# Patient Record
Sex: MALE | Race: WHITE | NOT HISPANIC OR LATINO
[De-identification: names, ages, dates, MRNs, and addresses within clinical notes are randomized per-mention and may not be internally consistent; named-entity substitution may affect disease eponyms.]

---

## 2023-02-13 PROBLEM — Z00.00 ENCOUNTER FOR PREVENTIVE HEALTH EXAMINATION: Status: ACTIVE | Noted: 2023-02-13

## 2023-02-15 ENCOUNTER — APPOINTMENT (OUTPATIENT)
Dept: NEUROSURGERY | Facility: CLINIC | Age: 58
End: 2023-02-15
Payer: COMMERCIAL

## 2023-02-15 VITALS
DIASTOLIC BLOOD PRESSURE: 71 MMHG | HEART RATE: 60 BPM | OXYGEN SATURATION: 98 % | BODY MASS INDEX: 25.05 KG/M2 | HEIGHT: 70 IN | WEIGHT: 175 LBS | SYSTOLIC BLOOD PRESSURE: 113 MMHG

## 2023-02-15 DIAGNOSIS — Z80.7 FAMILY HISTORY OF OTHER MALIGNANT NEOPLASMS OF LYMPHOID, HEMATOPOIETIC AND RELATED TISSUES: ICD-10-CM

## 2023-02-15 DIAGNOSIS — Z78.9 OTHER SPECIFIED HEALTH STATUS: ICD-10-CM

## 2023-02-15 DIAGNOSIS — Z86.79 PERSONAL HISTORY OF OTHER DISEASES OF THE CIRCULATORY SYSTEM: ICD-10-CM

## 2023-02-15 DIAGNOSIS — K46.9 UNSPECIFIED ABDOMINAL HERNIA W/OUT OBSTRUCTION OR GANGRENE: ICD-10-CM

## 2023-02-15 DIAGNOSIS — Z86.59 PERSONAL HISTORY OF OTHER MENTAL AND BEHAVIORAL DISORDERS: ICD-10-CM

## 2023-02-15 PROCEDURE — 99205 OFFICE O/P NEW HI 60 MIN: CPT

## 2023-02-15 RX ORDER — LEVETIRACETAM 750 MG/1
750 TABLET, FILM COATED ORAL TWICE DAILY
Qty: 60 | Refills: 1 | Status: ACTIVE | COMMUNITY
Start: 2023-02-15 | End: 1900-01-01

## 2023-02-15 RX ORDER — LEVETIRACETAM 500 MG/1
500 TABLET, FILM COATED ORAL TWICE DAILY
Qty: 60 | Refills: 1 | Status: DISCONTINUED | COMMUNITY
Start: 2023-02-15 | End: 2023-02-15

## 2023-02-15 NOTE — HISTORY OF PRESENT ILLNESS
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 Nguyen. He has a PMHx of [employer] dual-chamber pacemaker placement following presentation with recurrent syncope. 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 Patient was recently in [place] on vacation when he experienced the sudden onset of right sided distal upper and lower extremity apraxia associated with decreased sensation. Patient described sensation as a "discomfort" and as a loss of proprioception. Denies paresthesias. Reports decreased dexterity in his hand during this episode. Patient also mentioned right sided lower extremity discomfort for about 8 weeks prior which he attributed to radiculpathy related to his history of dextroscoliosis. He was evaluated at [facility]. Stroke work up was negative for ischemic or hemorrhagic stroke. However, CT demonstrated findings most consistent with a left parietal parafalcine/medial convexity meningioma with subjacent vasogenic edema. Patient was unable to obtain MRI at that time due to questionable compatibly with patient's pacemaker. Symptoms resolved and was discharged home. \par \par He denies recurrence of symptoms since initial onset. Denies headaches, visual disturbances, upper/lower extremity numbness/paresthesias, or gait instability.

## 2023-02-15 NOTE — DATA REVIEWED
[de-identified] : CT HEAD WITHOUT CONTRAST: 2/10/23: IMPRESSION- Approximately 3 cm x 2.8 cm mildly hyperdense left posterior parietal/vertex mass with associated vasogenic edema, suspect meningioma. There is regional mass effect on the left lateral ventricle/occipital horn. No large territory infract or hemorrhage.  [de-identified] : CTA HEAD/NECK: 2/10/23: IMPRESSION- No significant stenosis at the intradural/visualized segments. No evidence of intracranial aneurysm or vascular malformation.

## 2023-02-15 NOTE — ASSESSMENT
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 CT and CTA brain 2/10/23 reviewed independently by me demonstrates an approximately 3 cm x 2.8 cm hyperdense left medial parietal, parafalcine, extra-axial mass lesion with subjacent vasogenic edema. There is regional mass effect on the left lateral ventricle/occipital horn. Findings are most consistent with a left parietal parafalcine/medial convexity meningioma.\par \par Natural history and alternative management strategies discussed in detail with patient and his wife. Surgical resection with preoperative endovascular transarterial tumor embolization recommended. Risks of diagnostic cerebral angiography with endovascular tumor embolization including but not limited to bleeding, infection, vascular injury, need for surgical repair, life threatening hematoma, limb ischemia, renal failure, stroke, coma/death, weakness/paralysis, visual/sensory loss, loss of speech/language/cognitive/memory function, changes in personality/behavior, failure of procedure, need for additional procedures discussed. Risks of surgery including but not limited to bleeding, infection, vascular injury, brain hemorrhage, brain swelling, stroke, coma/death, weakness/paralysis, visual/sensory loss, loss of speech/language/cognitive/memory function, changes in personality/behavior, failure to improve, failure to resect, recurrence, need for subsequent procedures discussed. I discussed the probability of transient right sided weakness and sensory changes, lower extremity greater than upper extremity, due to the location of the tumor relative to Rolandic cortex. He and his wife are considering my recommendations. \par \par I have requested MRI brain with and without gadolinium to include a frameless protocol. Our office is helping to facilitate this with radiology given Dr. Gamez's pacemaker (which is reported to be MRI compatible).\par \par If  Dr. Gamez and Mrs. Gamez wish to proceed, we will schedule him for left craniotomy for resection of meningioma with frameless stereotactic navigation in the near future. He will require a frameless protocol MRI with gadolinium and frameless CT head without contrast prior to the scheduled date for surgery. We will schedule him for preoperative diagnostic cerebral angiography with endovascular tumor embolization with general anesthesia 1-3 days prior to surgical resection. \par \par I have asked the Dr. Gamez and his wife to contact me for any symptomatic development or progression in the interim at which time we can obtain expedited follow up imaging. They have indicated that they will contact my office with how they wish to proceed. I have also offered to facilitate additional neurosurgical opinions if they wish to pursue this, both within and external to the system. \par \par A total of 60 minutes were spent relative to this encounter.\par \par \par \par \par

## 2023-02-17 ENCOUNTER — TRANSCRIPTION ENCOUNTER (OUTPATIENT)
Age: 58
End: 2023-02-17

## 2023-02-21 ENCOUNTER — RESULT REVIEW (OUTPATIENT)
Age: 58
End: 2023-02-21

## 2023-02-23 ENCOUNTER — APPOINTMENT (OUTPATIENT)
Dept: NEUROSURGERY | Facility: HOSPITAL | Age: 58
End: 2023-02-23

## 2023-02-26 ENCOUNTER — RESULT REVIEW (OUTPATIENT)
Age: 58
End: 2023-02-26

## 2023-02-27 ENCOUNTER — APPOINTMENT (OUTPATIENT)
Dept: NEUROSURGERY | Facility: HOSPITAL | Age: 58
End: 2023-02-27

## 2023-02-27 ENCOUNTER — TRANSCRIPTION ENCOUNTER (OUTPATIENT)
Age: 58
End: 2023-02-27

## 2023-02-28 ENCOUNTER — TRANSCRIPTION ENCOUNTER (OUTPATIENT)
Age: 58
End: 2023-02-28

## 2023-03-01 ENCOUNTER — TRANSCRIPTION ENCOUNTER (OUTPATIENT)
Age: 58
End: 2023-03-01

## 2023-03-09 ENCOUNTER — APPOINTMENT (OUTPATIENT)
Dept: HEMATOLOGY ONCOLOGY | Facility: CLINIC | Age: 58
End: 2023-03-09
Payer: COMMERCIAL

## 2023-03-09 VITALS
SYSTOLIC BLOOD PRESSURE: 129 MMHG | BODY MASS INDEX: 24.77 KG/M2 | HEIGHT: 70 IN | DIASTOLIC BLOOD PRESSURE: 76 MMHG | OXYGEN SATURATION: 100 % | RESPIRATION RATE: 18 BRPM | HEART RATE: 61 BPM | TEMPERATURE: 99.2 F | WEIGHT: 173 LBS

## 2023-03-09 DIAGNOSIS — D32.9 BENIGN NEOPLASM OF MENINGES, UNSPECIFIED: ICD-10-CM

## 2023-03-09 PROCEDURE — 99205 OFFICE O/P NEW HI 60 MIN: CPT

## 2023-03-09 RX ORDER — ARIPIPRAZOLE 2 MG/1
2 TABLET ORAL DAILY
Qty: 30 | Refills: 0 | Status: ACTIVE | COMMUNITY
Start: 2023-03-09

## 2023-03-09 RX ORDER — ESCITALOPRAM OXALATE 20 MG/1
20 TABLET ORAL DAILY
Qty: 30 | Refills: 0 | Status: ACTIVE | COMMUNITY
Start: 2023-03-09

## 2023-03-09 NOTE — ASSESSMENT
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 Adjuvant RT improves local control, aims to prevent further neurologic morbidity related to growth of the residual tumor, and may improve survival. Accurate delineation of the residual tumor following surgery using contemporary imaging studies is critical for optimal results with postoperative RT. \par \par For patients who undergo apparent complete resection of an atypical meningioma, the role of adjuvant RT is controversial. Although multicenter trials are ongoing, there are no randomized data yet available to guide decision-making in these patients, and practice varies across centers. The potential benefits of adjuvant RT in terms of preventing or delaying tumor recurrence are more closely balanced with the risks and side effects of RT, and it is particularly important to assess individual patient preferences and tolerance for risk, which may vary when presented with similar information. The goal of RT is to maximize local tumor control and prevent neurologic morbidity associated with tumor recurrence. The estimated recurrence rate after imaging-confirmed gross total resection in patients not treated with adjuvant RT is approximately 30 to 50 percent at a median of five years or less, with rates of failure trending higher with longer follow-up.\par \par In the intermediate-risk group of the prospective Radiation Therapy Oncology Group (RTOG) 0539 trial, 52 patients with completely resected, newly diagnosed atypical meningioma or recurrent grade 1 meningioma were enrolled prospectively; all received RT (54 Gy in 30 fractions). Three-year progression-free survival was 94 percent, which was improved compared with an expected rate of 70 percent based on historical controls. Very similar three-year progression-free survival (89 percent) was reported in the prospective arm of the  [employer]ization for Research and Treatment of Cancer (EORTC) 54930-47572 study that included 56 patients with completely resected atypical meningiomas who received a higher dose of postoperative RT (60 Gy in 30 fractions).\par \par In this situation, there is known residual, thoguh not macroscopic. Unable to obtain MRI as PM leads are incompatible and he needs likely PM exchange. \par \par He will need MRIB to better monitor for disease recurrence. \par Will refer to radiation oncology for RT evaluation.\par Will present at [facility].\par \par RTC after MRIB.

## 2023-03-09 NOTE — HISTORY OF PRESENT ILLNESS
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  \par Feb 2023:He was in [place] in February 2023, when he had sudden onset of right-sided distal upper and lower extremity apraxia associated with decreased sensation.  He went to ED and had CTH which revealed left parietal parafalcine/medical convexity meningioma with subadjacent vasogenic edema.\par 2/15/2023: Saw .\par 2/22/2023: CT HEAD WITHOUT CONTRAST: There is a 3.0 x 3.1 x 3.1 cm hyperdense extra axial mass along the parasagittal left frontal and parietal lobes in the region of the central sulcus. There is mass effect with vasogenic edema in the posterior left frontal parietal white matter, and partial effacement of the posterior body and posterior horn of the left lateral ventricle. IMPRESSION: CT head: Left parasagittal frontoparietal hyperdense extra-axial mass with mass effect, may represent a meningioma.\par 2/23/2023: Tumor embolization. Resection, A small amount of tumor capsule was necessarily left in situ due to adherence to the bridging vein just proximal to its entry into the superior sagittal sinus in order to avoid injury to this large vein and possible neurologic sequelae. At this time, all bulky tumor had been removed. \par 2/27/2023 Pathology: Atypical meningioma (WHO grade 2). Tissue sections show a meningioma  with brain invasion (better seen on B5). Immunostains are performed on unstatined slides from original block B3 at [facility] and show tumor cells are positive for EMA (patchy). Rare tumor cells are positive for NM. The Ki-67 labeling index is moderately elevated (7-9%). pHH3 immunostain shows the mitotic activity is increased (4-5 mitoses/10 HPF).  The findings are diagnostic of atypical meningioma, WHO grade 2.\par 2/28/23: Status post biparietal craniotomy at the vertex for resection of previously seen left parietal parafalcine extra-axial mass, likely meningioma. Gross total resection suggested, however, limited without IV contrast. Expected postsurgical changes including a tiny amount of hemorrhagic products adjacent to the surgical site and pneumocephalus. No large intracranial hemorrhage. Stable residual vasogenic edema in the left parietal white matter.   [de-identified] : Atypical meningioma - WHO grade 2 [de-identified] : NeuroSx: \par Neurology:  [de-identified] : Feels well since the surgery. No more episodes of unilateral numbness and loss of sensation.

## 2023-03-20 ENCOUNTER — NON-APPOINTMENT (OUTPATIENT)
Age: 58
End: 2023-03-20

## 2023-03-28 ENCOUNTER — APPOINTMENT (OUTPATIENT)
Dept: NEUROSURGERY | Facility: CLINIC | Age: 58
End: 2023-03-28
Payer: COMMERCIAL

## 2023-03-28 PROCEDURE — 99024 POSTOP FOLLOW-UP VISIT: CPT

## 2023-03-28 NOTE — PHYSICAL EXAM
[General Appearance - Alert] : alert [General Appearance - In No Acute Distress] : in no acute distress [General Appearance - Well Nourished] : well nourished [Oriented To Time, Place, And Person] : oriented to person, place, and time [Impaired Insight] : insight and judgment were intact [Affect] : the affect was normal [Cranial Nerves Optic (II)] : visual acuity intact bilaterally,  pupils equal round and reactive to light [Cranial Nerves Oculomotor (III)] : extraocular motion intact [Cranial Nerves Trigeminal (V)] : facial sensation intact symmetrically [Cranial Nerves Facial (VII)] : face symmetrical [Cranial Nerves Vestibulocochlear (VIII)] : hearing was intact bilaterally [Cranial Nerves Glossopharyngeal (IX)] : tongue and palate midline [Cranial Nerves Accessory (XI - Cranial And Spinal)] : head turning and shoulder shrug symmetric [Cranial Nerves Hypoglossal (XII)] : there was no tongue deviation with protrusion [Motor Tone] : muscle tone was normal in all four extremities [Motor Strength] : muscle strength was normal in all four extremities [Sensation Tactile Decrease] : light touch was intact [Abnormal Walk] : normal gait [Balance] : balance was intact [Well-Healed] : well-healed

## 2023-03-28 NOTE — DATA REVIEWED
[de-identified] : CT HEAD WITHOUT CONTRAST: 2/27/23: IMPRESSION- tatus post biparietal craniotomy at the vertex for resection of\par previously seen left parietal parafalcine extra-axial mass, likely meningioma.\par Gross total resection suggested, however, limited without IV contrast. \par \par 2. Expected postsurgical changes including a tiny amount of hemorrhagic\par products adjacent to the surgical site and pneumocephalus. No large\par intracranial hemorrhage. Stable residual vasogenic edema in the left parietal\par white matter.\par CT HEAD WITHOUT CONTRAST: 2/23/23: IMPRESSION- Left parasagittal frontal parietal hyperdense extra-axial mass with small foci\par of hyperdensity posteriorly medially, likely related to embolization as per\par history. The mass may represent a meningioma.\par \par No acute intracranial hemorrhage.\par

## 2023-03-28 NOTE — HISTORY OF PRESENT ILLNESS
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 A small amount of tumor capsule was necessary left in situ due to adherence to a large cortical bridging vein to the superior sagittal sinus. Patient was discharged on 3/23 and prescribed Keppra 750 mg po BID as well as dexamethasone taper. \par \par Patient has completed dexamethasone taper. Reports gradual improvement of distal right lower extremity residual numbness and altered sensation which has now become intermittent and has significantly decreased in severity, Reports recent onset of mild bitemporal headaches which resolve with Tylenol. Patient was evaluated by Dr. Nanci Landers and confirmed pre operative paroxysmal episodes were most consistent with simple partial seizures. He continues to take Keppra 750 mg BID under Dr. Landers's direction. Patient evaluated by neuro oncologist, Dr. Issac Cervantes, who recommended adjuvant radiation therapy. Today, he was evaluated by radiation oncologist, Dr. Karolyn River from [facility], who recommended adjuvant radiation therapy as well due to atypical, WHO Grade II status of meningioma. Patient plans to proceed with stereotactic radiation therapy following MRI which has been scheduled at [facility] for June 2023. Denies other neurological symptoms at this time.

## 2023-03-28 NOTE — ASSESSMENT
[FreeTextEntry1] : Dr. Gamez presents status post Tanner Grade II resection of a left parietal parafalcine WHO Grade II meningioma following preoperative endovascular transarterial embolization. A small amount of tumor capsule was necessarily left in situ due to adherence to the bridging vein just proximal to its entry into the superior sagittal sinus in order to avoid injury to this large vein and possible neurologic sequelae. He has made a remarkable recovery. He has experienced near complete resolution of now intermittent right lower extremity numbness when compared to pre operative status.\par \par I am quite please with Dr. Gamez's recovery. I plan to obtain MRI brain with and without gadolinium to include a frameless protocol in 3 months to serve as a new postoperative baseline MRI. Dr. Gamez has been approved to undergo this MRI at [facility] in June 2023. I have asked Dr. Gamez to obtain a CD with this MRI in June so that I may review it. I will then follow up with him by phone or Telehealth visit, or in person if he prefers. Dr. Gamez has an upcoming appointment for an additional opinion regarding radiation therapy with Dr. Portillo.  If the patient decides to proceed with radiation therapy, I will then defer the timing of additional surveillance imaging to the radiation oncology team. \par \par I counseled Dr. Gamez to continue follow up with Dr. Landers for antiseizure prophylaxis. \par \par I have asked the patient and his wife to contact me for any symptomatic development or progression in the interim at which time we can obtain expedited follow up imaging.\par \par A total of 45 minutes were spent relative to this encounter.\par  \par \par \par

## 2023-04-12 ENCOUNTER — RX RENEWAL (OUTPATIENT)
Age: 58
End: 2023-04-12

## 2023-04-25 ENCOUNTER — APPOINTMENT (OUTPATIENT)
Dept: RADIATION ONCOLOGY | Facility: CLINIC | Age: 58
End: 2023-04-25

## 2023-08-28 ENCOUNTER — APPOINTMENT (OUTPATIENT)
Dept: NEUROSURGERY | Facility: CLINIC | Age: 58
End: 2023-08-28
Payer: COMMERCIAL

## 2023-08-28 PROCEDURE — 99215 OFFICE O/P EST HI 40 MIN: CPT

## 2023-08-28 NOTE — DATA REVIEWED
[de-identified] : CT HEAD WITHOUT CONTRAST: 8/22/23: IMPRESSION- Postsurgical changes related to meningioma resection, as described above.     No evidence of acute intracranial pathology.     Inflammatory changes of the paranasal sinuses, as described above, including  small bilateral maxillary sinus air-fluid levels, which could indicate acute  sinusitis, correlate clinically. Additional findings as described above.     [de-identified] : MRI BRAIN WITH AND WITHOUT CONTRAST: 6/9/23: IMPRESSION- Parietal craniotomy. Underlying the craniotomy, surgical cavity is seen in the left parietal region which causes some mass effect on the underlying pars marginalis. There is no enhancement seen to suggest residual/recurrent meningioma.

## 2023-08-28 NOTE — HISTORY OF PRESENT ILLNESS
[FreeTextEntry1] : Dr. 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 Previous history of preoperative left middle meningeal artery parietal branch transarterial tumor embolization 2/23/23 followed by left parietal craniotomy for parafalcine atypical meningioma (WHO Grade II Ki67 labeling index 7-9%, 4-5 mitoses/HPF). Surveillance MRI 6/9/23 demonstrated no overt evidence for meningioma recurrence. He has proceeded with radiation therapy at [facility], total of 33 rounds of fractionated stereotactic radiotherapy, which completed last Wednesday, August 23rd.  Dr. Gamez returns to the office with wound drainage that began approximately 2-3 weeks ago.  States that he first noticed a small scab along the right lateral margin of the incision line that he manipulated and began bleeding at that time.  Last week, he began experiencing increased drainage which he described as a mixture of blood, clear fluid, and a small amount of purulent material.  He was referred to plastic surgery, Dr. Santosh Paulson, and was placed on a 10-day course of cefadroxil 500mg daily and Silvadene cream.  He is scheduled to undergo wound revision under the direction of Dr. Paulson on 8/30/23.  CT head without contrast performed on 8/22, demonstrating no underlying destruction of the bone suggestive of osteomyelitis and no extraaxial fluid collection to suggest infectious process.    He otherwise denies headaches, visual disturbances, extremity numbness/weakness, gait instability, fever.

## 2023-08-28 NOTE — ASSESSMENT
[FreeTextEntry1] : Dr. Gamez status post Tanner Grade II resection of a left parietal parafalcine WHO Grade II meningioma following preoperative endovascular transarterial embolization.  Surveillance MRI 6/9/23 reviewed independently by me demonstrates no overt evidence for residual or recurrent meningioma.  He has completed fractionated radiation therapy at [facility] as of 8/23.  Presents with 2-3 week history of wound drainage within radiation field at the right lateral margin of his previously healed scalp incision.  CT head without contrast 8/22/23 reviewed independently by me demonstrates no evidence of osteomyelitis or extra axial fluid collection. The area of wound dehiscence is not directly overlying implanted cranial fixation hardware.  Currently receiving oral antibiotics and local wound care under the direction of Dr. Santosh Paulson, who is planning wound revision and complex closure on 8/30/23.  We will continue to perform surveillance imaging every 6 months given the atypical nature of his meningioma and at the recommendation of his radiation oncology team.  Next scheduled MRI brain with and without gadolinium including frameless protocol will be performed in December 2023.  Due to his permanent pacemaker, the patient obtains his MRI in a specialized facility through [facility].  Therefore, we will begin coordinating his MRI in November 2023.  I have asked the patient and his wife to contact me for any symptomatic development or progression in the interim at which time we can obtain expedited follow up imaging.  A total of 45 minutes were spent relative to this encounter.

## 2023-08-28 NOTE — PHYSICAL EXAM
[General Appearance - Alert] : alert [General Appearance - In No Acute Distress] : in no acute distress [Oriented To Time, Place, And Person] : oriented to person, place, and time [Impaired Insight] : insight and judgment were intact [Cranial Nerves Optic (II)] : visual acuity intact bilaterally,  pupils equal round and reactive to light [Cranial Nerves Oculomotor (III)] : extraocular motion intact [Cranial Nerves Trigeminal (V)] : facial sensation intact symmetrically [Cranial Nerves Facial (VII)] : face symmetrical [Cranial Nerves Vestibulocochlear (VIII)] : hearing was intact bilaterally [Cranial Nerves Glossopharyngeal (IX)] : tongue and palate midline [Cranial Nerves Accessory (XI - Cranial And Spinal)] : head turning and shoulder shrug symmetric [Cranial Nerves Hypoglossal (XII)] : there was no tongue deviation with protrusion [Sensation Tactile Decrease] : light touch was intact [Abnormal Walk] : normal gait [Balance] : balance was intact [FreeTextEntry1] : left scalp wound with approximately 1cm in circumference.  Scant serosanguinous drainage on dressing placed by patient at home this morning.  No active drainage.  Mild surrounding erythema.

## 2023-08-31 ENCOUNTER — TRANSCRIPTION ENCOUNTER (OUTPATIENT)
Age: 58
End: 2023-08-31

## 2023-11-02 DIAGNOSIS — R05.9 COUGH, UNSPECIFIED: ICD-10-CM

## 2024-01-29 ENCOUNTER — NON-APPOINTMENT (OUTPATIENT)
Age: 59
End: 2024-01-29

## 2025-01-21 ENCOUNTER — APPOINTMENT (OUTPATIENT)
Dept: NEUROSURGERY | Facility: CLINIC | Age: 60
End: 2025-01-21
Payer: COMMERCIAL

## 2025-01-21 PROCEDURE — G2211 COMPLEX E/M VISIT ADD ON: CPT | Mod: 95

## 2025-01-21 PROCEDURE — 99215 OFFICE O/P EST HI 40 MIN: CPT | Mod: 95

## 2025-02-11 DIAGNOSIS — B00.9 HERPESVIRAL INFECTION, UNSPECIFIED: ICD-10-CM

## 2025-02-11 RX ORDER — VALACYCLOVIR 1 G/1
1 TABLET, FILM COATED ORAL
Qty: 20 | Refills: 1 | Status: ACTIVE | COMMUNITY
Start: 2025-02-11 | End: 1900-01-01

## 2025-05-28 ENCOUNTER — NON-APPOINTMENT (OUTPATIENT)
Age: 60
End: 2025-05-28

## 2025-05-30 ENCOUNTER — NON-APPOINTMENT (OUTPATIENT)
Age: 60
End: 2025-05-30

## 2025-05-30 ENCOUNTER — APPOINTMENT (OUTPATIENT)
Dept: INTERNAL MEDICINE | Facility: CLINIC | Age: 60
End: 2025-05-30
Payer: COMMERCIAL

## 2025-05-30 VITALS
HEIGHT: 70 IN | TEMPERATURE: 98.2 F | HEART RATE: 60 BPM | RESPIRATION RATE: 16 BRPM | SYSTOLIC BLOOD PRESSURE: 114 MMHG | BODY MASS INDEX: 27.35 KG/M2 | WEIGHT: 191 LBS | DIASTOLIC BLOOD PRESSURE: 72 MMHG | OXYGEN SATURATION: 99 %

## 2025-05-30 DIAGNOSIS — F32.5 MAJOR DEPRESSIVE DISORDER, SINGLE EPISODE, IN FULL REMISSION: ICD-10-CM

## 2025-05-30 DIAGNOSIS — E78.5 HYPERLIPIDEMIA, UNSPECIFIED: ICD-10-CM

## 2025-05-30 DIAGNOSIS — D32.9 BENIGN NEOPLASM OF MENINGES, UNSPECIFIED: ICD-10-CM

## 2025-05-30 DIAGNOSIS — F41.9 ANXIETY DISORDER, UNSPECIFIED: ICD-10-CM

## 2025-05-30 DIAGNOSIS — I49.5 SICK SINUS SYNDROME: ICD-10-CM

## 2025-05-30 PROCEDURE — 93000 ELECTROCARDIOGRAM COMPLETE: CPT

## 2025-05-30 PROCEDURE — 99386 PREV VISIT NEW AGE 40-64: CPT

## 2025-06-02 ENCOUNTER — NON-APPOINTMENT (OUTPATIENT)
Age: 60
End: 2025-06-02

## 2025-06-04 DIAGNOSIS — D69.6 THROMBOCYTOPENIA, UNSPECIFIED: ICD-10-CM

## 2025-06-04 LAB
ALBUMIN SERPL ELPH-MCNC: 4.8 G/DL
ALP BLD-CCNC: 77 U/L
ALT SERPL-CCNC: 46 U/L
ANION GAP SERPL CALC-SCNC: 15 MMOL/L
APPEARANCE: CLEAR
AST SERPL-CCNC: 37 U/L
BACTERIA: NEGATIVE /HPF
BASOPHILS # BLD AUTO: 0.02 K/UL
BASOPHILS NFR BLD AUTO: 0.3 %
BILIRUB SERPL-MCNC: 0.5 MG/DL
BILIRUBIN URINE: NEGATIVE
BLOOD URINE: NEGATIVE
BUN SERPL-MCNC: 16 MG/DL
CALCIUM SERPL-MCNC: 9.4 MG/DL
CAST: 0 /LPF
CHLORIDE SERPL-SCNC: 106 MMOL/L
CHOLEST SERPL-MCNC: 231 MG/DL
CO2 SERPL-SCNC: 20 MMOL/L
COLOR: YELLOW
CREAT SERPL-MCNC: 0.98 MG/DL
DRE ABNORMAL+AA+ARIS: 14 %
DRE ABNORMAL+AFRICAN ANCESTRY: 7 %
DRE ABNORMAL+ARIS: 10 %
DRE ABNORMAL+FAM HIST+AA+ARIS: 21 %
DRE ABNORMAL+FAM HIST+AA: 11 %
DRE ABNORMAL+FAM HIST+ARIS: 16 %
DRE ABNORMAL+FAM HIST: 8 %
DRE ABNORMAL: 5 %
DRE NORMAL+AA+ARIS: 7 %
DRE NORMAL+AFRICAN ANCESTRY: 3 %
DRE NORMAL+ARIS: 5 %
DRE NORMAL+FAM HIST+AA+ARIS: 10 %
DRE NORMAL+FAM HIST+AA: 5 %
DRE NORMAL+FAM HIST+ARIS: 8 %
DRE NORMAL+FAM HIST: 4 %
DRE NORMAL: 2 %
EGFRCR SERPLBLD CKD-EPI 2021: 88 ML/MIN/1.73M2
EOSINOPHIL # BLD AUTO: 0.05 K/UL
EOSINOPHIL NFR BLD AUTO: 0.6 %
EPITHELIAL CELLS: 0 /HPF
ESTIMATED AVERAGE GLUCOSE: 103 MG/DL
GLUCOSE QUALITATIVE U: NEGATIVE MG/DL
GLUCOSE SERPL-MCNC: 95 MG/DL
HBA1C MFR BLD HPLC: 5.2 %
HCT VFR BLD CALC: 42.3 %
HDLC SERPL-MCNC: 65 MG/DL
HGB BLD-MCNC: 14.7 G/DL
IMM GRANULOCYTES NFR BLD AUTO: 0.4 %
KETONES URINE: NEGATIVE MG/DL
LDLC SERPL-MCNC: 156 MG/DL
LEUKOCYTE ESTERASE URINE: ABNORMAL
LYMPHOCYTES # BLD AUTO: 3.43 K/UL
LYMPHOCYTES NFR BLD AUTO: 44.4 %
MAN DIFF?: NORMAL
MCHC RBC-ENTMCNC: 31.9 PG
MCHC RBC-ENTMCNC: 34.8 G/DL
MCV RBC AUTO: 91.8 FL
MICROSCOPIC-UA: NORMAL
MONOCYTES # BLD AUTO: 0.39 K/UL
MONOCYTES NFR BLD AUTO: 5.1 %
NEUTROPHILS # BLD AUTO: 3.8 K/UL
NEUTROPHILS NFR BLD AUTO: 49.2 %
NITRITE URINE: NEGATIVE
NONHDLC SERPL-MCNC: 166 MG/DL
PH URINE: 7.5
PLATELET # BLD AUTO: 148 K/UL
POTASSIUM SERPL-SCNC: 4.4 MMOL/L
PROT SERPL-MCNC: 7.1 G/DL
PROTEIN URINE: NORMAL MG/DL
PSA RISK STRATIFICATION INTERP: NORMAL
PSA RISK STRATIFICATION: NORMAL
PSA SERPL-MCNC: 0.61 NG/ML
RBC # BLD: 4.61 M/UL
RBC # FLD: 14.2 %
RED BLOOD CELLS URINE: 1 /HPF
SODIUM SERPL-SCNC: 141 MMOL/L
SPECIFIC GRAVITY URINE: 1.02
T4 FREE SERPL-MCNC: 1 NG/DL
TRIGL SERPL-MCNC: 60 MG/DL
TSH SERPL-ACNC: 1.36 UIU/ML
UROBILINOGEN URINE: 1 MG/DL
WBC # FLD AUTO: 7.72 K/UL
WHITE BLOOD CELLS URINE: 0 /HPF